# Patient Record
Sex: FEMALE | Race: WHITE | NOT HISPANIC OR LATINO | Employment: FULL TIME | URBAN - METROPOLITAN AREA
[De-identification: names, ages, dates, MRNs, and addresses within clinical notes are randomized per-mention and may not be internally consistent; named-entity substitution may affect disease eponyms.]

---

## 2021-02-15 ENCOUNTER — APPOINTMENT (OUTPATIENT)
Dept: VACCINE CLINIC | Facility: HOSPITAL | Age: 41
End: 2021-02-15

## 2023-04-11 ENCOUNTER — OFFICE VISIT (OUTPATIENT)
Dept: OBSTETRICS AND GYNECOLOGY | Facility: CLINIC | Age: 43
End: 2023-04-11
Payer: COMMERCIAL

## 2023-04-11 VITALS
WEIGHT: 123.4 LBS | DIASTOLIC BLOOD PRESSURE: 62 MMHG | HEIGHT: 66 IN | SYSTOLIC BLOOD PRESSURE: 106 MMHG | BODY MASS INDEX: 19.83 KG/M2

## 2023-04-11 DIAGNOSIS — Z01.419 WOMEN'S ANNUAL ROUTINE GYNECOLOGICAL EXAMINATION: Primary | ICD-10-CM

## 2023-04-11 DIAGNOSIS — Z12.31 BREAST CANCER SCREENING BY MAMMOGRAM: ICD-10-CM

## 2023-04-11 NOTE — PROGRESS NOTES
Gynecologic Annual Exam Note          GYN Annual Exam     Establish Care and Gynecologic Exam        Subjective     HPI  Feli Good is a 43 y.o. female, , who presents for annual well woman exam as a previous patient not seen in the last three years . Patient's last menstrual period was 2023 (exact date)..  Patient reports problems with: none.  Her periods occur every 25-35 days , lasting >7 days. The flow is moderate.. She reports dysmenorrhea is mild, occurring premenstrually and first 1-2 days of flow. Partner Status: Marital Status: . She is is sexually active. She has not had new partners.. STD testing recommendations have been explained to the patient and she does not desire STD testing. There were no changes to her medical or surgical history since her last visit..       Additional OB/GYN History   Current contraception: contraceptive methods: Vasectomy   Desires to: do not start contraception    Last Pap : 2020. Result: negative. HPV: not done. ([facility])  Last Completed Pap Smear     This patient has no relevant Health Maintenance data.        History of abnormal Pap smear: no  Family history of uterine, colon, breast, or ovarian cancer: no  Performs monthly Self-Breast Exam: yes  Last mammogram: 2015.     Last Completed Mammogram     This patient has no relevant Health Maintenance data.          History of abnormal mammogram: no    Colonoscopy: has never had a colonoscopy.  Exercises Regularly: yes  Feelings of Anxiety or Depression: no  Tobacco Usage?: No     No current outpatient medications on file.     Patient denies the need for medication refills today.    OB History        2    Para   2    Term   2            AB        Living   2       SAB        IAB        Ectopic        Molar        Multiple        Live Births   2                History reviewed. No pertinent past medical history.     History reviewed. No pertinent surgical history.    Health  "Maintenance   Topic Date Due   • Annual Gynecologic Pelvic and Breast Exam  Never done   • TDAP/TD VACCINES (1 - Tdap) Never done   • MAMMOGRAM  12/21/2016   • COVID-19 Vaccine (3 - Booster for Moderna series) 04/23/2021   • HEPATITIS C SCREENING  Never done   • ANNUAL PHYSICAL  Never done   • PAP SMEAR  Never done   • INFLUENZA VACCINE  08/01/2023   • Pneumococcal Vaccine 0-64  Aged Out       The additional following portions of the patient's history were reviewed and updated as appropriate: allergies, current medications, past family history, past medical history, past social history and past surgical history.    Review of Systems      I have reviewed and agree with the HPI, ROS, and historical information as entered above. Radha Al, APRN      Objective   /62 (BP Location: Right arm, Patient Position: Sitting, Cuff Size: Adult)   Ht 167.6 cm (66\")   Wt 56 kg (123 lb 6.4 oz)   LMP 03/25/2023 (Exact Date)   BMI 19.92 kg/m²     Physical Exam  Vitals and nursing note reviewed. Exam conducted with a chaperone present.   Constitutional:       General: She is not in acute distress.     Appearance: Normal appearance. She is well-developed. She is not ill-appearing.   Neck:      Thyroid: No thyroid mass or thyromegaly.   Pulmonary:      Effort: Pulmonary effort is normal. No respiratory distress or retractions.   Chest:      Chest wall: No mass.   Breasts:     Right: Normal. No mass, nipple discharge, skin change or tenderness.      Left: Normal. No mass, nipple discharge, skin change or tenderness.   Abdominal:      General: There is no distension.      Palpations: Abdomen is soft. Abdomen is not rigid. There is no mass.      Tenderness: There is no abdominal tenderness. There is no guarding or rebound.      Hernia: No hernia is present. There is no hernia in the left inguinal area.   Genitourinary:     General: Normal vulva.      Labia:         Right: No rash, tenderness or lesion.         Left: No rash, " tenderness or lesion.       Vagina: Normal. No vaginal discharge or lesions.      Cervix: Normal.      Uterus: Normal. Not enlarged, not fixed and not tender.       Adnexa: Right adnexa normal and left adnexa normal.        Right: No mass or tenderness.          Left: No mass or tenderness.        Rectum: No external hemorrhoid.   Musculoskeletal:      Cervical back: No muscular tenderness.   Skin:     General: Skin is warm and dry.   Neurological:      Mental Status: She is alert and oriented to person, place, and time.   Psychiatric:         Mood and Affect: Mood normal.         Behavior: Behavior normal.            Assessment and Plan    Problem List Items Addressed This Visit    None  Visit Diagnoses     Women's annual routine gynecological examination    -  Primary    Relevant Orders    LIQUID-BASED PAP SMEAR WITH HPV GENOTYPING REGARDLESS OF INTERPRETATION ([facility])    Breast cancer screening by mammogram        Relevant Orders    Mammo Screening Digital Tomosynthesis Bilateral With CAD          1. GYN annual well woman exam.   2. Pap guidelines reviewed.  3. Reviewed monthly self breast exams.  Instructed to call with lumps, pain, or breast discharge.    4. Ordered Mammogram today  5. Reviewed exercise as a preventative health measures.   6. Reccommended Flu Vaccine in Fall of each year.  7. RTC in 1 year or PRN with problems.  8. Return in about 1 year (around 4/11/2024) for Annual physical.     Radha Al, APRN  04/11/2023

## 2025-03-21 ENCOUNTER — APPOINTMENT (OUTPATIENT)
Dept: GENERAL RADIOLOGY | Facility: HOSPITAL | Age: 45
End: 2025-03-21
Payer: COMMERCIAL

## 2025-03-21 ENCOUNTER — HOSPITAL ENCOUNTER (EMERGENCY)
Facility: HOSPITAL | Age: 45
Discharge: HOME | End: 2025-03-21
Attending: STUDENT IN AN ORGANIZED HEALTH CARE EDUCATION/TRAINING PROGRAM
Payer: COMMERCIAL

## 2025-03-21 VITALS
TEMPERATURE: 97.7 F | RESPIRATION RATE: 18 BRPM | DIASTOLIC BLOOD PRESSURE: 77 MMHG | BODY MASS INDEX: 20.09 KG/M2 | SYSTOLIC BLOOD PRESSURE: 137 MMHG | HEIGHT: 66 IN | WEIGHT: 125 LBS | OXYGEN SATURATION: 100 % | HEART RATE: 93 BPM

## 2025-03-21 DIAGNOSIS — R42 LIGHTHEADED: ICD-10-CM

## 2025-03-21 DIAGNOSIS — R55 POSTURAL DIZZINESS WITH PRESYNCOPE: Primary | ICD-10-CM

## 2025-03-21 DIAGNOSIS — R25.1 TREMULOUSNESS: ICD-10-CM

## 2025-03-21 DIAGNOSIS — R42 POSTURAL DIZZINESS WITH PRESYNCOPE: Primary | ICD-10-CM

## 2025-03-21 LAB
ALBUMIN SERPL-MCNC: 4.5 G/DL (ref 3.5–5.2)
ALBUMIN/GLOB SERPL: 2 G/DL
ALP SERPL-CCNC: 64 U/L (ref 39–117)
ALT SERPL W P-5'-P-CCNC: 26 U/L (ref 1–33)
AMPHET+METHAMPHET UR QL: NEGATIVE
AMPHETAMINES UR QL: NEGATIVE
ANION GAP SERPL CALCULATED.3IONS-SCNC: 11 MMOL/L (ref 5–15)
APAP SERPL-MCNC: <5 MCG/ML (ref 0–30)
AST SERPL-CCNC: 26 U/L (ref 1–32)
B-HCG UR QL: NEGATIVE
BARBITURATES UR QL SCN: NEGATIVE
BASOPHILS # BLD AUTO: 0.05 10*3/MM3 (ref 0–0.2)
BASOPHILS NFR BLD AUTO: 0.7 % (ref 0–1.5)
BENZODIAZ UR QL SCN: NEGATIVE
BILIRUB SERPL-MCNC: 0.4 MG/DL (ref 0–1.2)
BILIRUB UR QL STRIP: NEGATIVE
BUN SERPL-MCNC: 12 MG/DL (ref 6–20)
BUN/CREAT SERPL: 23.1 (ref 7–25)
BUPRENORPHINE SERPL-MCNC: NEGATIVE NG/ML
CALCIUM SPEC-SCNC: 9.1 MG/DL (ref 8.6–10.5)
CANNABINOIDS SERPL QL: NEGATIVE
CHLORIDE SERPL-SCNC: 103 MMOL/L (ref 98–107)
CLARITY UR: CLEAR
CO2 SERPL-SCNC: 25 MMOL/L (ref 22–29)
COCAINE UR QL: NEGATIVE
COLOR UR: YELLOW
CREAT SERPL-MCNC: 0.52 MG/DL (ref 0.57–1)
D DIMER PPP FEU-MCNC: <0.27 MCGFEU/ML (ref 0–0.5)
D-LACTATE SERPL-SCNC: 1.2 MMOL/L (ref 0.5–2)
DEPRECATED RDW RBC AUTO: 40 FL (ref 37–54)
EGFRCR SERPLBLD CKD-EPI 2021: 117.7 ML/MIN/1.73
EOSINOPHIL # BLD AUTO: 0.05 10*3/MM3 (ref 0–0.4)
EOSINOPHIL NFR BLD AUTO: 0.7 % (ref 0.3–6.2)
ERYTHROCYTE [DISTWIDTH] IN BLOOD BY AUTOMATED COUNT: 11.9 % (ref 12.3–15.4)
ETHANOL BLD-MCNC: <10 MG/DL (ref 0–10)
EXPIRATION DATE: NORMAL
FENTANYL UR-MCNC: NEGATIVE NG/ML
GLOBULIN UR ELPH-MCNC: 2.2 GM/DL
GLUCOSE SERPL-MCNC: 109 MG/DL (ref 65–99)
GLUCOSE UR STRIP-MCNC: NEGATIVE MG/DL
HCT VFR BLD AUTO: 41.1 % (ref 34–46.6)
HGB BLD-MCNC: 14.4 G/DL (ref 12–15.9)
HGB UR QL STRIP.AUTO: NEGATIVE
IMM GRANULOCYTES # BLD AUTO: 0.02 10*3/MM3 (ref 0–0.05)
IMM GRANULOCYTES NFR BLD AUTO: 0.3 % (ref 0–0.5)
INTERNAL NEGATIVE CONTROL: NEGATIVE
INTERNAL POSITIVE CONTROL: POSITIVE
KETONES UR QL STRIP: NEGATIVE
LEUKOCYTE ESTERASE UR QL STRIP.AUTO: NEGATIVE
LYMPHOCYTES # BLD AUTO: 1.03 10*3/MM3 (ref 0.7–3.1)
LYMPHOCYTES NFR BLD AUTO: 15.2 % (ref 19.6–45.3)
Lab: NORMAL
MAGNESIUM SERPL-MCNC: 1.9 MG/DL (ref 1.6–2.6)
MCH RBC QN AUTO: 31.9 PG (ref 26.6–33)
MCHC RBC AUTO-ENTMCNC: 35 G/DL (ref 31.5–35.7)
MCV RBC AUTO: 90.9 FL (ref 79–97)
METHADONE UR QL SCN: NEGATIVE
MONOCYTES # BLD AUTO: 0.34 10*3/MM3 (ref 0.1–0.9)
MONOCYTES NFR BLD AUTO: 5 % (ref 5–12)
NEUTROPHILS NFR BLD AUTO: 5.3 10*3/MM3 (ref 1.7–7)
NEUTROPHILS NFR BLD AUTO: 78.1 % (ref 42.7–76)
NITRITE UR QL STRIP: NEGATIVE
NRBC BLD AUTO-RTO: 0 /100 WBC (ref 0–0.2)
NT-PROBNP SERPL-MCNC: 168.2 PG/ML (ref 0–450)
OPIATES UR QL: NEGATIVE
OXYCODONE UR QL SCN: NEGATIVE
PCP UR QL SCN: NEGATIVE
PH UR STRIP.AUTO: 6.5 [PH] (ref 5–8)
PHOSPHATE SERPL-MCNC: 1.6 MG/DL (ref 2.5–4.5)
PLATELET # BLD AUTO: 197 10*3/MM3 (ref 140–450)
PMV BLD AUTO: 9.9 FL (ref 6–12)
POTASSIUM SERPL-SCNC: 3.5 MMOL/L (ref 3.5–5.2)
PROT SERPL-MCNC: 6.7 G/DL (ref 6–8.5)
PROT UR QL STRIP: NEGATIVE
RBC # BLD AUTO: 4.52 10*6/MM3 (ref 3.77–5.28)
SALICYLATES SERPL-MCNC: <0.3 MG/DL
SODIUM SERPL-SCNC: 139 MMOL/L (ref 136–145)
SP GR UR STRIP: 1.01 (ref 1–1.03)
T4 FREE SERPL-MCNC: 1.39 NG/DL (ref 0.92–1.68)
TRICYCLICS UR QL SCN: NEGATIVE
TROPONIN T SERPL HS-MCNC: <6 NG/L
TSH SERPL DL<=0.05 MIU/L-ACNC: 3.42 UIU/ML (ref 0.27–4.2)
UROBILINOGEN UR QL STRIP: NORMAL
WBC NRBC COR # BLD AUTO: 6.79 10*3/MM3 (ref 3.4–10.8)

## 2025-03-21 PROCEDURE — 82077 ASSAY SPEC XCP UR&BREATH IA: CPT | Performed by: STUDENT IN AN ORGANIZED HEALTH CARE EDUCATION/TRAINING PROGRAM

## 2025-03-21 PROCEDURE — 84439 ASSAY OF FREE THYROXINE: CPT | Performed by: STUDENT IN AN ORGANIZED HEALTH CARE EDUCATION/TRAINING PROGRAM

## 2025-03-21 PROCEDURE — 71045 X-RAY EXAM CHEST 1 VIEW: CPT

## 2025-03-21 PROCEDURE — 83880 ASSAY OF NATRIURETIC PEPTIDE: CPT | Performed by: STUDENT IN AN ORGANIZED HEALTH CARE EDUCATION/TRAINING PROGRAM

## 2025-03-21 PROCEDURE — 84100 ASSAY OF PHOSPHORUS: CPT | Performed by: STUDENT IN AN ORGANIZED HEALTH CARE EDUCATION/TRAINING PROGRAM

## 2025-03-21 PROCEDURE — 80179 DRUG ASSAY SALICYLATE: CPT | Performed by: STUDENT IN AN ORGANIZED HEALTH CARE EDUCATION/TRAINING PROGRAM

## 2025-03-21 PROCEDURE — 83735 ASSAY OF MAGNESIUM: CPT | Performed by: STUDENT IN AN ORGANIZED HEALTH CARE EDUCATION/TRAINING PROGRAM

## 2025-03-21 PROCEDURE — 85379 FIBRIN DEGRADATION QUANT: CPT | Performed by: STUDENT IN AN ORGANIZED HEALTH CARE EDUCATION/TRAINING PROGRAM

## 2025-03-21 PROCEDURE — 80053 COMPREHEN METABOLIC PANEL: CPT | Performed by: STUDENT IN AN ORGANIZED HEALTH CARE EDUCATION/TRAINING PROGRAM

## 2025-03-21 PROCEDURE — 85025 COMPLETE CBC W/AUTO DIFF WBC: CPT | Performed by: STUDENT IN AN ORGANIZED HEALTH CARE EDUCATION/TRAINING PROGRAM

## 2025-03-21 PROCEDURE — 81025 URINE PREGNANCY TEST: CPT | Performed by: STUDENT IN AN ORGANIZED HEALTH CARE EDUCATION/TRAINING PROGRAM

## 2025-03-21 PROCEDURE — 80307 DRUG TEST PRSMV CHEM ANLYZR: CPT | Performed by: STUDENT IN AN ORGANIZED HEALTH CARE EDUCATION/TRAINING PROGRAM

## 2025-03-21 PROCEDURE — 99284 EMERGENCY DEPT VISIT MOD MDM: CPT

## 2025-03-21 PROCEDURE — 80143 DRUG ASSAY ACETAMINOPHEN: CPT | Performed by: STUDENT IN AN ORGANIZED HEALTH CARE EDUCATION/TRAINING PROGRAM

## 2025-03-21 PROCEDURE — 93005 ELECTROCARDIOGRAM TRACING: CPT | Performed by: STUDENT IN AN ORGANIZED HEALTH CARE EDUCATION/TRAINING PROGRAM

## 2025-03-21 PROCEDURE — 84443 ASSAY THYROID STIM HORMONE: CPT | Performed by: STUDENT IN AN ORGANIZED HEALTH CARE EDUCATION/TRAINING PROGRAM

## 2025-03-21 PROCEDURE — 25810000003 LACTATED RINGERS SOLUTION: Performed by: STUDENT IN AN ORGANIZED HEALTH CARE EDUCATION/TRAINING PROGRAM

## 2025-03-21 PROCEDURE — 96360 HYDRATION IV INFUSION INIT: CPT

## 2025-03-21 PROCEDURE — 81003 URINALYSIS AUTO W/O SCOPE: CPT | Performed by: STUDENT IN AN ORGANIZED HEALTH CARE EDUCATION/TRAINING PROGRAM

## 2025-03-21 PROCEDURE — 84484 ASSAY OF TROPONIN QUANT: CPT | Performed by: STUDENT IN AN ORGANIZED HEALTH CARE EDUCATION/TRAINING PROGRAM

## 2025-03-21 PROCEDURE — 83605 ASSAY OF LACTIC ACID: CPT | Performed by: STUDENT IN AN ORGANIZED HEALTH CARE EDUCATION/TRAINING PROGRAM

## 2025-03-21 PROCEDURE — 36415 COLL VENOUS BLD VENIPUNCTURE: CPT

## 2025-03-21 RX ORDER — MECLIZINE HYDROCHLORIDE 25 MG/1
25 TABLET ORAL ONCE
Status: DISCONTINUED | OUTPATIENT
Start: 2025-03-21 | End: 2025-03-21 | Stop reason: HOSPADM

## 2025-03-21 RX ORDER — HYDROXYZINE HYDROCHLORIDE 25 MG/1
25 TABLET, FILM COATED ORAL EVERY 8 HOURS PRN
Qty: 10 TABLET | Refills: 0 | Status: SHIPPED | OUTPATIENT
Start: 2025-03-21

## 2025-03-21 RX ORDER — HYDROXYZINE HYDROCHLORIDE 25 MG/1
25 TABLET, FILM COATED ORAL ONCE
Status: DISCONTINUED | OUTPATIENT
Start: 2025-03-21 | End: 2025-03-21 | Stop reason: HOSPADM

## 2025-03-21 RX ADMIN — SODIUM CHLORIDE, SODIUM LACTATE, POTASSIUM CHLORIDE, AND CALCIUM CHLORIDE 1000 ML: .6; .31; .03; .02 INJECTION, SOLUTION INTRAVENOUS at 17:39

## 2025-03-21 RX ADMIN — POTASSIUM, SODIUM PHOSPHATES 280 MG-160 MG-250 MG ORAL POWDER PACKET 2 PACKET: POWDER IN PACKET at 18:00

## 2025-03-21 NOTE — ED PROVIDER NOTES
EMERGENCY DEPARTMENT ENCOUNTER    Pt Name: Feli Good  MRN: [id number]  Pt :   1980  Room Number:    Date of encounter:  3/21/2025  PCP: Vicente Mann DO  ED Provider: Ish Schultz MD    Historian: Mother, patient      HPI:  Chief Complaint: Presyncope        Context: Feli Good is a 45-year-old woman who is accompanied by her mother provides most of the history she was brought in by EMS from her PCP because of recurrent episodes of lightheadedness she says she had similar things when she was a teenager and was diagnosed with vasovagal syncope.  Today she has had 6 episodes of feeling extremely lightheaded she also feels tremulous without chest pain.  She says she does have history of elevated thyroid antibodies but is not on any thyroid medication she denies recent illness or fevers denies any new stressors or fatigue.  No other complaints at this time.       PAST MEDICAL HISTORY  No past medical history on file.      PAST SURGICAL HISTORY  No past surgical history on file.      FAMILY HISTORY  Family History   Problem Relation Age of Onset    Thyroid disease Father     Thyroid disease Paternal Grandmother     Thyroid disease Paternal Aunt     Breast cancer Neg Hx     Ovarian cancer Neg Hx     Uterine cancer Neg Hx     Colon cancer Neg Hx          SOCIAL HISTORY  Social History     Socioeconomic History    Marital status:    Tobacco Use    Smoking status: Never    Smokeless tobacco: Never   Vaping Use    Vaping status: Never Used   Substance and Sexual Activity    Alcohol use: Yes     Alcohol/week: 3.0 standard drinks of alcohol     Types: 3 Glasses of wine per week    Drug use: Never    Sexual activity: Yes     Partners: Male     Birth control/protection: Vasectomy         ALLERGIES  Patient has no known allergies.        REVIEW OF SYSTEMS  Review of Systems       All systems reviewed and negative except for those discussed in HPI.       PHYSICAL EXAM    I have reviewed the  triage vital signs and nursing notes.    ED Triage Vitals [03/21/25 1606]   Temp Heart Rate Resp BP SpO2   97.7 °F (36.5 °C) 90 16 134/87 100 %      Temp src Heart Rate Source Patient Position BP Location FiO2 (%)   Oral Monitor Sitting Right arm --       Physical Exam  GENERAL:   Appears anxious, uncomfortable  HENT: Nares patent.  EYES: No scleral icterus.  CV: Regular rhythm, regular rate.  RESPIRATORY: Normal effort.  No audible wheezes, rales or rhonchi.  ABDOMEN: Soft, nontender  MUSCULOSKELETAL: No deformities.   NEURO: Alert, moves all extremities, follows commands.  SKIN: Warm, dry, no rash visualized.      LAB RESULTS  Recent Results (from the past 24 hours)   Comprehensive Metabolic Panel    Collection Time: 03/21/25  4:36 PM    Specimen: Blood   Result Value Ref Range    Glucose 109 (H) 65 - 99 mg/dL    BUN 12 6 - 20 mg/dL    Creatinine 0.52 (L) 0.57 - 1.00 mg/dL    Sodium 139 136 - 145 mmol/L    Potassium 3.5 3.5 - 5.2 mmol/L    Chloride 103 98 - 107 mmol/L    CO2 25.0 22.0 - 29.0 mmol/L    Calcium 9.1 8.6 - 10.5 mg/dL    Total Protein 6.7 6.0 - 8.5 g/dL    Albumin 4.5 3.5 - 5.2 g/dL    ALT (SGPT) 26 1 - 33 U/L    AST (SGOT) 26 1 - 32 U/L    Alkaline Phosphatase 64 39 - 117 U/L    Total Bilirubin 0.4 0.0 - 1.2 mg/dL    Globulin 2.2 gm/dL    A/G Ratio 2.0 g/dL    BUN/Creatinine Ratio 23.1 7.0 - 25.0    Anion Gap 11.0 5.0 - 15.0 mmol/L    eGFR 117.7 >60.0 mL/min/1.73   High Sensitivity Troponin T    Collection Time: 03/21/25  4:36 PM    Specimen: Blood   Result Value Ref Range    HS Troponin T <6 <14 ng/L   BNP    Collection Time: 03/21/25  4:36 PM    Specimen: Blood   Result Value Ref Range    proBNP 168.2 0.0 - 450.0 pg/mL   Lactic Acid, Plasma    Collection Time: 03/21/25  4:36 PM    Specimen: Blood   Result Value Ref Range    Lactate 1.2 0.5 - 2.0 mmol/L   Magnesium    Collection Time: 03/21/25  4:36 PM    Specimen: Blood   Result Value Ref Range    Magnesium 1.9 1.6 - 2.6 mg/dL   Phosphorus     Collection Time: 03/21/25  4:36 PM    Specimen: Blood   Result Value Ref Range    Phosphorus 1.6 (C) 2.5 - 4.5 mg/dL   TSH    Collection Time: 03/21/25  4:36 PM    Specimen: Blood   Result Value Ref Range    TSH 3.420 0.270 - 4.200 uIU/mL   T4, Free    Collection Time: 03/21/25  4:36 PM    Specimen: Blood   Result Value Ref Range    Free T4 1.39 0.92 - 1.68 ng/dL   CBC Auto Differential    Collection Time: 03/21/25  4:36 PM    Specimen: Blood   Result Value Ref Range    WBC 6.79 3.40 - 10.80 10*3/mm3    RBC 4.52 3.77 - 5.28 10*6/mm3    Hemoglobin 14.4 12.0 - 15.9 g/dL    Hematocrit 41.1 34.0 - 46.6 %    MCV 90.9 79.0 - 97.0 fL    MCH 31.9 26.6 - 33.0 pg    MCHC 35.0 31.5 - 35.7 g/dL    RDW 11.9 (L) 12.3 - 15.4 %    RDW-SD 40.0 37.0 - 54.0 fl    MPV 9.9 6.0 - 12.0 fL    Platelets 197 140 - 450 10*3/mm3    Neutrophil % 78.1 (H) 42.7 - 76.0 %    Lymphocyte % 15.2 (L) 19.6 - 45.3 %    Monocyte % 5.0 5.0 - 12.0 %    Eosinophil % 0.7 0.3 - 6.2 %    Basophil % 0.7 0.0 - 1.5 %    Immature Grans % 0.3 0.0 - 0.5 %    Neutrophils, Absolute 5.30 1.70 - 7.00 10*3/mm3    Lymphocytes, Absolute 1.03 0.70 - 3.10 10*3/mm3    Monocytes, Absolute 0.34 0.10 - 0.90 10*3/mm3    Eosinophils, Absolute 0.05 0.00 - 0.40 10*3/mm3    Basophils, Absolute 0.05 0.00 - 0.20 10*3/mm3    Immature Grans, Absolute 0.02 0.00 - 0.05 10*3/mm3    nRBC 0.0 0.0 - 0.2 /100 WBC   D-dimer, Quantitative    Collection Time: 03/21/25  4:36 PM    Specimen: Blood   Result Value Ref Range    D-Dimer, Quantitative <0.27 0.00 - 0.50 MCGFEU/mL   Acetaminophen Level    Collection Time: 03/21/25  4:36 PM    Specimen: Blood   Result Value Ref Range    Acetaminophen <5.0 0.0 - 30.0 mcg/mL   Ethanol    Collection Time: 03/21/25  4:36 PM    Specimen: Blood   Result Value Ref Range    Ethanol <10 0 - 10 mg/dL   Salicylate Level    Collection Time: 03/21/25  4:36 PM    Specimen: Blood   Result Value Ref Range    Salicylate <0.3 <=30.0 mg/dL   Urinalysis With Microscopic If  Indicated (No Culture) - Urine, Clean Catch    Collection Time: 03/21/25  4:37 PM    Specimen: Urine, Clean Catch   Result Value Ref Range    Color, UA Yellow Yellow, Straw    Appearance, UA Clear Clear    pH, UA 6.5 5.0 - 8.0    Specific Gravity, UA 1.013 1.001 - 1.030    Glucose, UA Negative Negative    Ketones, UA Negative Negative    Bilirubin, UA Negative Negative    Blood, UA Negative Negative    Protein, UA Negative Negative    Leuk Esterase, UA Negative Negative    Nitrite, UA Negative Negative    Urobilinogen, UA 0.2 E.U./dL 0.2 - 1.0 E.U./dL   Urine Drug Screen - Urine, Clean Catch    Collection Time: 03/21/25  4:37 PM    Specimen: Urine, Clean Catch   Result Value Ref Range    THC, Screen, Urine Negative Negative    Phencyclidine (PCP), Urine Negative Negative    Cocaine Screen, Urine Negative Negative    Methamphetamine, Ur Negative Negative    Opiate Screen Negative Negative    Amphetamine Screen, Urine Negative Negative    Benzodiazepine Screen, Urine Negative Negative    Tricyclic Antidepressants Screen Negative Negative    Methadone Screen, Urine Negative Negative    Barbiturates Screen, Urine Negative Negative    Oxycodone Screen, Urine Negative Negative    Buprenorphine, Screen, Urine Negative Negative   Fentanyl, Urine - Urine, Clean Catch    Collection Time: 03/21/25  4:37 PM    Specimen: Urine, Clean Catch   Result Value Ref Range    Fentanyl, Urine Negative Negative   ECG 12 Lead Syncope    Collection Time: 03/21/25  4:40 PM   Result Value Ref Range    QT Interval 370 ms    QTC Interval 437 ms   POC Urine Pregnancy    Collection Time: 03/21/25  4:47 PM    Specimen: Urine   Result Value Ref Range    HCG, Urine, QL Negative     Lot Number [id number]     Internal Positive Control Positive     Internal Negative Control Negative     Expiration Date 05/27/2026        If labs were ordered, I independently reviewed the results and considered them in treating the patient.        RADIOLOGY  XR Chest 1  View  Result Date: 3/21/2025  XR CHEST 1 VW Date of Exam: 3/21/2025 5:19 PM EDT Indication: palpitations, presyncope Comparison: None available. Findings: Lungs are well expanded and appear clear. No pneumothorax or large pleural effusion is seen. Heart size is normal. There is mild leftward curvature of the upper thoracic spine. Zipper projects over the upper chest.     Impression: No acute cardiopulmonary abnormality is identified. Electronically Signed: Coco Madden  3/21/2025 5:31 PM EDT  Workstation ID: [id number]      I ordered and independently reviewed the above noted radiographic studies.      I viewed images of chest x-ray which showed no acute pathology per my independent interpretation.    See radiologist's dictation for official interpretation.        PROCEDURES    Procedures    ECG 12 Lead Syncope   Preliminary Result   Test Reason : Syncope   Blood Pressure :   */*   mmHG   Vent. Rate :  84 BPM     Atrial Rate :  84 BPM      P-R Int : 144 ms          QRS Dur : 106 ms       QT Int : 370 ms       P-R-T Axes :  81  83  58 degrees     QTcB Int : 437 ms      Normal sinus rhythm with sinus arrhythmia   Incomplete right bundle branch block   Borderline ECG   No previous ECGs available      Referred By:            Confirmed By:           MEDICATIONS GIVEN IN ER    Medications   meclizine (ANTIVERT) tablet 25 mg (25 mg Oral Not Given 3/21/25 1750)   hydrOXYzine (ATARAX) tablet 25 mg (25 mg Oral Not Given 3/21/25 1750)   lactated ringers bolus 1,000 mL (1,000 mL Intravenous New Bag 3/21/25 1739)   potassium & sodium phosphates (PHOS-NAK) 280-160-250 MG packet 2 packet (2 packets Oral Given 3/21/25 1800)         MEDICAL DECISION MAKING, PROGRESS, and CONSULTS    All labs, if obtained, have been independently reviewed by me.  All radiology studies, if obtained, have been reviewed by me and the radiologist dictating the report.  All EKGs, if obtained, have been independently viewed and interpreted by me/my  attending physician.      Discussion below represents my analysis of pertinent findings related to patient's condition, differential diagnosis, treatment plan and final disposition.                                          Differential diagnosis:    Cardiac presyncope, vasovagal syncope, anxiety attacks, anemia, electrolyte abnormality, pulmonary embolism, hypothyroidism      Additional sources:    - Discussed/ obtained information from independent historians: Mother    - External (non-ED) record review:  Chart review shows outpatient PCP notes with history of shortness of breath and visit earlier today for lightheadedness.    - Chronic or social conditions impacting care: History of vasovagal episodes        Orders placed during this visit:  Orders Placed This Encounter   Procedures    XR Chest 1 View    Comprehensive Metabolic Panel    Urinalysis With Microscopic If Indicated (No Culture) - Urine, Clean Catch    High Sensitivity Troponin T    BNP    Lactic Acid, Plasma    Magnesium    Phosphorus    TSH    T4, Free    CBC Auto Differential    D-dimer, Quantitative    Acetaminophen Level    Ethanol    Urine Drug Screen - Urine, Clean Catch    Salicylate Level    Fentanyl, Urine - Urine, Clean Catch    Orthostatic Vitals    POC Urine Pregnancy    ECG 12 Lead Syncope    CBC & Differential         Additional orders considered but not ordered:      ED Course:    Consultants:      ED Course as of 03/21/25 1841   Fri Mar 21, 2025   1605 Chart review shows outpatient PCP notes with history of shortness of breath and visit earlier today for lightheadedness. [CC]   1614 This is a 45-year-old woman who is accompanied by her mother provides most of the history she was brought in by EMS from her PCP because of recurrent episodes of lightheadedness she says she had similar things when she was a teenager and was diagnosed with vasovagal syncope.  Today she has had 6 episodes of feeling extremely lightheaded she also feels  tremulous without chest pain.  She says she does have history of elevated thyroid antibodies but is not on any thyroid medication she denies recent illness or fevers denies any new stressors or fatigue.  No other complaints at this time. [CC]   1615 She arrived awake and alert vitals are within normal limits she is afebrile she is somewhat anxious in appearance wanting her mother to do most of the talking for her.  Concern for thyroid abnormalities myocardial infarction pulmonary embolism, anemia, electrolyte abnormality etc. started on IV fluids and obtaining basic laboratory workup. [CC]   1642 ECG showing normal sinus rhythm and incomplete right bundle at a rate of 84.  Normal axis no significant QRS widening or QT prolongation. [CC]   1755 CBC and CMP reassuring and nonactionable  No elevation in D-dimer  Toxicologic screening is negative  Troponin is undetectably low Wilkinson syncope score is 0.  Normal thyroid function  Mild hypophosphatemia at 1.6 this may be contributing to her symptoms, I have ordered oral repletion. [CC]   1755 Toxicologic screening is negative. [CC]   1839 Everything else reassuring and nonactionable she declined the meclizine and hydroxyzine saying she is not dizzy and does not want the anxiety medicine.  I discussed the results with the patient and her mother there adamant that this has to do with her thyroid antibodies I have explained that her thyroid hormone level release today is completely normal but encouraged them to follow-up with her PCP on this.  Counseled on return precautions verbally expressed understanding of these [CC]      ED Course User Index  [CC] Ish Schultz MD              Shared Decision Making:  After my consideration of clinical presentation and any laboratory/radiology studies obtained, I discussed the findings with the patient/patient representative who is in agreement with the treatment plan and the final disposition.   Risks and benefits of  discharge and/or observation/admission were discussed.       AS OF 18:41 EDT VITALS:    BP - 129/76  HR - 90  TEMP - 97.7 °F (36.5 °C) (Oral)  O2 SATS - 99%                  DIAGNOSIS  Final diagnoses:   Postural dizziness with presyncope   Lightheaded   Tremulousness         DISPOSITION  DISCHARGE    Patient discharged in stable condition.    Reviewed implications of results, diagnosis, meds, responsibility to follow up, warning signs and symptoms of possible worsening, potential complications and reasons to return to ER.    Patient/Family voiced understanding of above instructions.    Discussed plan for discharge, as there is no emergent indication for admission.  Pt/family is agreeable and understands need for follow up and possible repeat testing.  Pt/family is aware that discharge does not mean that nothing is wrong but that it indicates no emergency is currently present that requires admission and they must continue care with follow-up as given below or with a physician of their choice.     FOLLOW-UP  Vicente Mann DO  [address] [place] [address] [phone]    Call            Medication List        New Prescriptions      hydrOXYzine 25 MG tablet  Commonly known as: ATARAX  Take 1 tablet by mouth Every 8 (Eight) Hours As Needed for Anxiety.               Where to Get Your Medications        These medications were sent to [facility] [id number] - [facility] [address] - [phone]  - [phone]   [address] [address]      Phone: [phone]   hydrOXYzine 25 MG tablet             Please note that portions of this document were completed with voice recognition software.        Ish Schultz MD  03/21/25 7105

## 2025-03-21 NOTE — DISCHARGE INSTRUCTIONS
Follow-up with PCP.  While today's workup was reassuring if symptoms change or worsen please return the ED or seek other medical care

## 2025-03-23 ENCOUNTER — APPOINTMENT (OUTPATIENT)
Dept: CT IMAGING | Facility: HOSPITAL | Age: 45
End: 2025-03-23
Payer: COMMERCIAL

## 2025-03-23 ENCOUNTER — HOSPITAL ENCOUNTER (OUTPATIENT)
Facility: HOSPITAL | Age: 45
Setting detail: OBSERVATION
Discharge: HOME | End: 2025-03-24
Attending: EMERGENCY MEDICINE | Admitting: INTERNAL MEDICINE
Payer: COMMERCIAL

## 2025-03-23 ENCOUNTER — APPOINTMENT (OUTPATIENT)
Dept: GENERAL RADIOLOGY | Facility: HOSPITAL | Age: 45
End: 2025-03-23
Payer: COMMERCIAL

## 2025-03-23 DIAGNOSIS — R55 NEAR SYNCOPE: Primary | ICD-10-CM

## 2025-03-23 DIAGNOSIS — E83.39 HYPOPHOSPHATEMIA: ICD-10-CM

## 2025-03-23 DIAGNOSIS — Z86.39 HISTORY OF THYROID DISEASE: ICD-10-CM

## 2025-03-23 DIAGNOSIS — Z02.89 REFERRED BY HEALTH CARE PROFESSIONAL: ICD-10-CM

## 2025-03-23 PROBLEM — F41.8 OTHER SPECIFIED ANXIETY DISORDERS: Status: ACTIVE | Noted: 2025-03-23

## 2025-03-23 PROBLEM — F41.0 PANIC ATTACK: Status: ACTIVE | Noted: 2025-03-23

## 2025-03-23 LAB
ALBUMIN SERPL-MCNC: 4.5 G/DL (ref 3.5–5.2)
ALBUMIN/GLOB SERPL: 1.9 G/DL
ALP SERPL-CCNC: 65 U/L (ref 39–117)
ALT SERPL W P-5'-P-CCNC: 26 U/L (ref 1–33)
ANION GAP SERPL CALCULATED.3IONS-SCNC: 11 MMOL/L (ref 5–15)
AST SERPL-CCNC: 23 U/L (ref 1–32)
B-HCG UR QL: NEGATIVE
BACTERIA UR QL AUTO: ABNORMAL /HPF
BASOPHILS # BLD AUTO: 0.04 10*3/MM3 (ref 0–0.2)
BASOPHILS NFR BLD AUTO: 0.8 % (ref 0–1.5)
BILIRUB SERPL-MCNC: 0.7 MG/DL (ref 0–1.2)
BILIRUB UR QL STRIP: NEGATIVE
BUN SERPL-MCNC: 11 MG/DL (ref 6–20)
BUN/CREAT SERPL: 17.2 (ref 7–25)
CALCIUM SPEC-SCNC: 9.5 MG/DL (ref 8.6–10.5)
CHLORIDE SERPL-SCNC: 108 MMOL/L (ref 98–107)
CLARITY UR: ABNORMAL
CO2 SERPL-SCNC: 25 MMOL/L (ref 22–29)
COLOR UR: YELLOW
CREAT SERPL-MCNC: 0.64 MG/DL (ref 0.57–1)
D-LACTATE SERPL-SCNC: 1.4 MMOL/L (ref 0.5–2)
DEPRECATED RDW RBC AUTO: 40.9 FL (ref 37–54)
EGFRCR SERPLBLD CKD-EPI 2021: 111.9 ML/MIN/1.73
EOSINOPHIL # BLD AUTO: 0.13 10*3/MM3 (ref 0–0.4)
EOSINOPHIL NFR BLD AUTO: 2.5 % (ref 0.3–6.2)
ERYTHROCYTE [DISTWIDTH] IN BLOOD BY AUTOMATED COUNT: 12.1 % (ref 12.3–15.4)
EXPIRATION DATE: NORMAL
GEN 5 1HR TROPONIN T REFLEX: <6 NG/L
GLOBULIN UR ELPH-MCNC: 2.4 GM/DL
GLUCOSE SERPL-MCNC: 97 MG/DL (ref 65–99)
GLUCOSE UR STRIP-MCNC: NEGATIVE MG/DL
HCT VFR BLD AUTO: 44.8 % (ref 34–46.6)
HGB BLD-MCNC: 15.3 G/DL (ref 12–15.9)
HGB UR QL STRIP.AUTO: NEGATIVE
HOLD SPECIMEN: NORMAL
HYALINE CASTS UR QL AUTO: ABNORMAL /LPF
IMM GRANULOCYTES # BLD AUTO: 0.02 10*3/MM3 (ref 0–0.05)
IMM GRANULOCYTES NFR BLD AUTO: 0.4 % (ref 0–0.5)
INTERNAL NEGATIVE CONTROL: NEGATIVE
INTERNAL POSITIVE CONTROL: POSITIVE
KETONES UR QL STRIP: NEGATIVE
LEUKOCYTE ESTERASE UR QL STRIP.AUTO: ABNORMAL
LYMPHOCYTES # BLD AUTO: 1.29 10*3/MM3 (ref 0.7–3.1)
LYMPHOCYTES NFR BLD AUTO: 24.7 % (ref 19.6–45.3)
Lab: NORMAL
MAGNESIUM SERPL-MCNC: 1.9 MG/DL (ref 1.6–2.6)
MCH RBC QN AUTO: 31.4 PG (ref 26.6–33)
MCHC RBC AUTO-ENTMCNC: 34.2 G/DL (ref 31.5–35.7)
MCV RBC AUTO: 92 FL (ref 79–97)
MONOCYTES # BLD AUTO: 0.31 10*3/MM3 (ref 0.1–0.9)
MONOCYTES NFR BLD AUTO: 5.9 % (ref 5–12)
NEUTROPHILS NFR BLD AUTO: 3.44 10*3/MM3 (ref 1.7–7)
NEUTROPHILS NFR BLD AUTO: 65.7 % (ref 42.7–76)
NITRITE UR QL STRIP: NEGATIVE
NRBC BLD AUTO-RTO: 0 /100 WBC (ref 0–0.2)
PH UR STRIP.AUTO: 7.5 [PH] (ref 5–8)
PHOSPHATE SERPL-MCNC: 2.2 MG/DL (ref 2.5–4.5)
PLATELET # BLD AUTO: 204 10*3/MM3 (ref 140–450)
PMV BLD AUTO: 9.8 FL (ref 6–12)
POTASSIUM SERPL-SCNC: 4.2 MMOL/L (ref 3.5–5.2)
PROCALCITONIN SERPL-MCNC: 0.04 NG/ML (ref 0–0.25)
PROT SERPL-MCNC: 6.9 G/DL (ref 6–8.5)
PROT UR QL STRIP: NEGATIVE
QT INTERVAL: 358 MS
QTC INTERVAL: 454 MS
RBC # BLD AUTO: 4.87 10*6/MM3 (ref 3.77–5.28)
RBC # UR STRIP: ABNORMAL /HPF
REF LAB TEST METHOD: ABNORMAL
SODIUM SERPL-SCNC: 144 MMOL/L (ref 136–145)
SP GR UR STRIP: <=1.005 (ref 1–1.03)
SQUAMOUS #/AREA URNS HPF: ABNORMAL /HPF
T4 FREE SERPL-MCNC: 1.35 NG/DL (ref 0.92–1.68)
TROPONIN T NUMERIC DELTA: NORMAL
TROPONIN T SERPL HS-MCNC: <6 NG/L
TSH SERPL DL<=0.05 MIU/L-ACNC: 2.98 UIU/ML (ref 0.27–4.2)
UROBILINOGEN UR QL STRIP: ABNORMAL
WBC # UR STRIP: ABNORMAL /HPF
WBC NRBC COR # BLD AUTO: 5.23 10*3/MM3 (ref 3.4–10.8)
WHOLE BLOOD HOLD COAG: NORMAL
WHOLE BLOOD HOLD SPECIMEN: NORMAL

## 2025-03-23 PROCEDURE — G0378 HOSPITAL OBSERVATION PER HR: HCPCS

## 2025-03-23 PROCEDURE — 80053 COMPREHEN METABOLIC PANEL: CPT | Performed by: EMERGENCY MEDICINE

## 2025-03-23 PROCEDURE — 96365 THER/PROPH/DIAG IV INF INIT: CPT

## 2025-03-23 PROCEDURE — 25510000001 IOPAMIDOL PER 1 ML: Performed by: EMERGENCY MEDICINE

## 2025-03-23 PROCEDURE — 83735 ASSAY OF MAGNESIUM: CPT | Performed by: EMERGENCY MEDICINE

## 2025-03-23 PROCEDURE — 25810000003 SODIUM CHLORIDE 0.9 % SOLUTION 250 ML FLEX CONT: Performed by: EMERGENCY MEDICINE

## 2025-03-23 PROCEDURE — 84443 ASSAY THYROID STIM HORMONE: CPT | Performed by: NURSE PRACTITIONER

## 2025-03-23 PROCEDURE — 96366 THER/PROPH/DIAG IV INF ADDON: CPT

## 2025-03-23 PROCEDURE — 81025 URINE PREGNANCY TEST: CPT | Performed by: NURSE PRACTITIONER

## 2025-03-23 PROCEDURE — 81001 URINALYSIS AUTO W/SCOPE: CPT | Performed by: EMERGENCY MEDICINE

## 2025-03-23 PROCEDURE — 71045 X-RAY EXAM CHEST 1 VIEW: CPT

## 2025-03-23 PROCEDURE — 93005 ELECTROCARDIOGRAM TRACING: CPT | Performed by: EMERGENCY MEDICINE

## 2025-03-23 PROCEDURE — 84484 ASSAY OF TROPONIN QUANT: CPT | Performed by: EMERGENCY MEDICINE

## 2025-03-23 PROCEDURE — 36415 COLL VENOUS BLD VENIPUNCTURE: CPT

## 2025-03-23 PROCEDURE — 99285 EMERGENCY DEPT VISIT HI MDM: CPT

## 2025-03-23 PROCEDURE — 84145 PROCALCITONIN (PCT): CPT | Performed by: NURSE PRACTITIONER

## 2025-03-23 PROCEDURE — 70450 CT HEAD/BRAIN W/O DYE: CPT

## 2025-03-23 PROCEDURE — 25810000003 SODIUM CHLORIDE 0.9 % SOLUTION: Performed by: NURSE PRACTITIONER

## 2025-03-23 PROCEDURE — 83605 ASSAY OF LACTIC ACID: CPT | Performed by: NURSE PRACTITIONER

## 2025-03-23 PROCEDURE — 84100 ASSAY OF PHOSPHORUS: CPT | Performed by: NURSE PRACTITIONER

## 2025-03-23 PROCEDURE — 99222 1ST HOSP IP/OBS MODERATE 55: CPT | Performed by: STUDENT IN AN ORGANIZED HEALTH CARE EDUCATION/TRAINING PROGRAM

## 2025-03-23 PROCEDURE — 71275 CT ANGIOGRAPHY CHEST: CPT

## 2025-03-23 PROCEDURE — 84439 ASSAY OF FREE THYROXINE: CPT | Performed by: NURSE PRACTITIONER

## 2025-03-23 PROCEDURE — 85025 COMPLETE CBC W/AUTO DIFF WBC: CPT | Performed by: EMERGENCY MEDICINE

## 2025-03-23 RX ORDER — SODIUM CHLORIDE 0.9 % (FLUSH) 0.9 %
10 SYRINGE (ML) INJECTION AS NEEDED
Status: DISCONTINUED | OUTPATIENT
Start: 2025-03-23 | End: 2025-03-24 | Stop reason: HOSPADM

## 2025-03-23 RX ORDER — BISACODYL 5 MG/1
5 TABLET, DELAYED RELEASE ORAL DAILY PRN
Status: DISCONTINUED | OUTPATIENT
Start: 2025-03-23 | End: 2025-03-24 | Stop reason: HOSPADM

## 2025-03-23 RX ORDER — CLONAZEPAM 0.5 MG/1
0.25 TABLET ORAL 2 TIMES DAILY PRN
Status: DISCONTINUED | OUTPATIENT
Start: 2025-03-23 | End: 2025-03-24 | Stop reason: HOSPADM

## 2025-03-23 RX ORDER — IOPAMIDOL 755 MG/ML
85 INJECTION, SOLUTION INTRAVASCULAR
Status: COMPLETED | OUTPATIENT
Start: 2025-03-23 | End: 2025-03-23

## 2025-03-23 RX ORDER — CHOLECALCIFEROL (VITAMIN D3) 25 MCG
1000 TABLET ORAL DAILY
Status: DISCONTINUED | OUTPATIENT
Start: 2025-03-23 | End: 2025-03-24 | Stop reason: HOSPADM

## 2025-03-23 RX ORDER — ACETAMINOPHEN 160 MG/5ML
650 SOLUTION ORAL EVERY 4 HOURS PRN
Status: DISCONTINUED | OUTPATIENT
Start: 2025-03-23 | End: 2025-03-24 | Stop reason: HOSPADM

## 2025-03-23 RX ORDER — ONDANSETRON 2 MG/ML
4 INJECTION INTRAMUSCULAR; INTRAVENOUS EVERY 6 HOURS PRN
Status: DISCONTINUED | OUTPATIENT
Start: 2025-03-23 | End: 2025-03-24 | Stop reason: HOSPADM

## 2025-03-23 RX ORDER — ACETAMINOPHEN 650 MG/1
650 SUPPOSITORY RECTAL EVERY 4 HOURS PRN
Status: DISCONTINUED | OUTPATIENT
Start: 2025-03-23 | End: 2025-03-24 | Stop reason: HOSPADM

## 2025-03-23 RX ORDER — CHOLECALCIFEROL (VITAMIN D3) 25 MCG
1000 TABLET ORAL DAILY
COMMUNITY

## 2025-03-23 RX ORDER — ESCITALOPRAM OXALATE 10 MG/1
5 TABLET ORAL DAILY
Status: DISCONTINUED | OUTPATIENT
Start: 2025-03-23 | End: 2025-03-24 | Stop reason: HOSPADM

## 2025-03-23 RX ORDER — ACETAMINOPHEN 325 MG/1
650 TABLET ORAL EVERY 4 HOURS PRN
Status: DISCONTINUED | OUTPATIENT
Start: 2025-03-23 | End: 2025-03-24 | Stop reason: HOSPADM

## 2025-03-23 RX ORDER — ONDANSETRON 4 MG/1
4 TABLET, ORALLY DISINTEGRATING ORAL EVERY 6 HOURS PRN
Status: DISCONTINUED | OUTPATIENT
Start: 2025-03-23 | End: 2025-03-24 | Stop reason: HOSPADM

## 2025-03-23 RX ORDER — SODIUM CHLORIDE 0.9 % (FLUSH) 0.9 %
10 SYRINGE (ML) INJECTION EVERY 12 HOURS SCHEDULED
Status: DISCONTINUED | OUTPATIENT
Start: 2025-03-23 | End: 2025-03-24 | Stop reason: HOSPADM

## 2025-03-23 RX ORDER — POLYETHYLENE GLYCOL 3350 17 G/17G
17 POWDER, FOR SOLUTION ORAL DAILY PRN
Status: DISCONTINUED | OUTPATIENT
Start: 2025-03-23 | End: 2025-03-24 | Stop reason: HOSPADM

## 2025-03-23 RX ORDER — MAGNESIUM OXIDE 400 MG/1
400 TABLET ORAL DAILY
COMMUNITY

## 2025-03-23 RX ORDER — BISACODYL 10 MG
10 SUPPOSITORY, RECTAL RECTAL DAILY PRN
Status: DISCONTINUED | OUTPATIENT
Start: 2025-03-23 | End: 2025-03-24 | Stop reason: HOSPADM

## 2025-03-23 RX ORDER — AMOXICILLIN 250 MG
2 CAPSULE ORAL 2 TIMES DAILY PRN
Status: DISCONTINUED | OUTPATIENT
Start: 2025-03-23 | End: 2025-03-24 | Stop reason: HOSPADM

## 2025-03-23 RX ADMIN — CLONAZEPAM 0.25 MG: 0.5 TABLET ORAL at 20:17

## 2025-03-23 RX ADMIN — SODIUM CHLORIDE 15 MMOL: 9 INJECTION, SOLUTION INTRAVENOUS at 17:11

## 2025-03-23 RX ADMIN — ESCITALOPRAM OXALATE 5 MG: 10 TABLET ORAL at 17:43

## 2025-03-23 RX ADMIN — IOPAMIDOL 85 ML: 755 INJECTION, SOLUTION INTRAVENOUS at 10:58

## 2025-03-23 RX ADMIN — MAGNESIUM OXIDE TAB 400 MG (241.3 MG ELEMENTAL MG) 400 MG: 400 (241.3 MG) TAB at 17:12

## 2025-03-23 RX ADMIN — Medication 1000 UNITS: at 17:12

## 2025-03-23 RX ADMIN — SODIUM CHLORIDE 1000 ML: 9 INJECTION, SOLUTION INTRAVENOUS at 10:31

## 2025-03-23 RX ADMIN — Medication 10 ML: at 20:18

## 2025-03-23 NOTE — CASE MANAGEMENT/SOCIAL WORK
Discharge Planning Assessment  [facility]     Patient Name: Feli Good  MRN: [id number]  Today's Date: 3/23/2025    Admit Date: 3/23/2025    Plan: IDP   Discharge Needs Assessment       Row Name 03/23/25 1507       Living Environment    People in Home child(alexsander), dependent;spouse    Current Living Arrangements home    Primary Care Provided by self    Provides Primary Care For child(alexsander)    Family Caregiver if Needed spouse    Able to Return to Prior Arrangements yes       Transition Planning    Patient/Family Anticipates Transition to home    Transportation Anticipated family or friend will provide       Discharge Needs Assessment    Readmission Within the Last 30 Days no previous admission in last 30 days    Equipment Currently Used at Home none    Concerns to be Addressed denies needs/concerns at this time                   Discharge Plan       Row Name 03/23/25 151       Plan    Plan IDP    Plan Comments MSW met with Pt at bedside to complete IDP. Pt lives with spouse and children in [place] [facility] [place] [facility] [place]. Pt’s PCP is Vicente Mann. Pt has [employer] insurance coverage. Pt independent with ADLs; Pt reports no DME, HH, or home O2. Pt’s preferred [facility]. Pt still drives and spouse can transport when medically ready. Pt denied needs or concerns. CM will continue to follow.                       Demographic Summary       Row Name 03/23/25 1509       General Information    Arrived From home    Referral Source admission list;emergency department    Reason for Consult discharge planning    Preferred Language English                   Functional Status       Row Name 03/23/25 1502       Functional Status    Usual Activity Tolerance good    Current Activity Tolerance good       Functional Status, IADL    Medications independent    Meal Preparation independent    Housekeeping independent    Laundry independent    Shopping independent                               MARTY Joel

## 2025-03-23 NOTE — ED PROVIDER NOTES
Subjective   History of Present Illness  Pleasant patient presents the ER for multiple episodes of near syncope.  She has had several episodes today around 3.  She had several yesterday as well.  She tells me she has had the symptoms off and on for couple months but is gotten more frequent within the last several days.  She was in our ER recently.  Overall reassuring lab work except for decreased phosphorus.  Patient tells me she does have a history of thyroid disease.  She is seeing functional medicine in the past and was previously on some hormones but she stopped taking them around several weeks to several months ago, she*developing the symptoms.  She does report seeing a neurologist when she was a teenager and she was diagnosed with vasovagal syncope.  This is similar but much worse.        Review of Systems    No past medical history on file.    No Known Allergies    No past surgical history on file.    Family History   Problem Relation Age of Onset    Thyroid disease Father     Thyroid disease Paternal Grandmother     Thyroid disease Paternal Aunt     Breast cancer Neg Hx     Ovarian cancer Neg Hx     Uterine cancer Neg Hx     Colon cancer Neg Hx        Social History     Socioeconomic History    Marital status:    Tobacco Use    Smoking status: Never    Smokeless tobacco: Never   Vaping Use    Vaping status: Never Used   Substance and Sexual Activity    Alcohol use: Yes     Alcohol/week: 3.0 standard drinks of alcohol     Types: 3 Glasses of wine per week    Drug use: Never    Sexual activity: Yes     Partners: Male     Birth control/protection: Vasectomy           Objective   Physical Exam  Constitutional:       Appearance: She is well-developed.   HENT:      Head: Normocephalic and atraumatic.      Right Ear: External ear normal.      Left Ear: External ear normal.      Nose: Nose normal.   Eyes:      Conjunctiva/sclera: Conjunctivae normal.      Pupils: Pupils are equal, round, and reactive to  light.   Cardiovascular:      Rate and Rhythm: Normal rate and regular rhythm.      Heart sounds: Normal heart sounds.   Pulmonary:      Effort: Pulmonary effort is normal.      Breath sounds: Normal breath sounds.   Abdominal:      General: Bowel sounds are normal.      Palpations: Abdomen is soft.   Musculoskeletal:         General: Normal range of motion.      Cervical back: Normal range of motion and neck supple.   Skin:     General: Skin is warm and dry.   Neurological:      Mental Status: She is alert and oriented to person, place, and time.   Psychiatric:         Behavior: Behavior normal.         Thought Content: Thought content normal.         Judgment: Judgment normal.         Procedures           ED Course  ED Course as of 03/23/25 1417   Sun Mar 23, 2025   0935 EKG interpreted by myself with no acute process [JM]   1025 Differential diagnosis includes arrhythmia.  Pulmonary embolism.  Electrolyte abnormality.  Thyroid disease.  Old records reviewed.  We will repeat labs.  I will also end up getting a CTA of the chest along with a CT of the head she does report being on hormones within the last several weeks but she is no longer taking them.  She is having more frequent near syncopal episodes with several today and yesterday. [JM]   1120 CT scan of the chest and head interpreted by myself with no acute process [JM]   1314 I had a good conversation with the patient and her .  We reviewed all aspects of her results here including the CAT scans.  We utilize shared decision making.  We discussed getting an MRI and admission as she has had these continued episodes of near syncope.  At this time they would like to discuss admission amongst himself's. [JM]   1413 Will admit to the hospitalist. [JM]   1416 Please see radiology interpretation for official reading.    [JM]      ED Course User Index  [JM] Jorge Noel APRN                                                       Medical Decision  Making  Problems Addressed:  History of thyroid disease: complicated acute illness or injury  Hypophosphatemia: complicated acute illness or injury  Near syncope: complicated acute illness or injury  Referred by health care professional: complicated acute illness or injury    Amount and/or Complexity of Data Reviewed  External Data Reviewed: labs, radiology and ECG.  Labs: ordered. Decision-making details documented in ED Course.  Radiology: ordered. Decision-making details documented in ED Course.  ECG/medicine tests: ordered. Decision-making details documented in ED Course.    Risk  Prescription drug management.  Decision regarding hospitalization.        Final diagnoses:   Near syncope   Hypophosphatemia   History of thyroid disease   Referred by health care professional       ED Disposition  ED Disposition       ED Disposition   Decision to Admit    Condition   --    Comment   --               No follow-up provider specified.       Medication List      No changes were made to your prescriptions during this visit.            Jorge Noel, APRN  03/23/25 [id number]

## 2025-03-23 NOTE — H&P
[facility] Medicine Services  HISTORY AND PHYSICAL    Patient Name: Feli Good  : 1980  MRN: [id number]  Primary Care Physician: Vicente Mann DO  Date of admission: 3/23/2025    Subjective   Subjective     Chief Complaint:  lightheadedness    HPI:  Feli Good is a 45 y.o. female With history of possible vasovagal syncope, who presented for evaluation of presyncopal symptoms.  As a teenager, she had recurrent episodes of lightheadedness and was diagnosed with vasovagal syncope.  She was seen recently in our ER on 3/21 for presyncopal symptoms. She was sent home from the ER at that time.  Symptoms persisted and worsened and she presented again for further evaluation.  Patient stated that she had several episodes on Tuesday, Wednesday, Friday, and a few yesterday.  Episodes consist of beginning to feel lightheadedness, then feels like her heart is racing and feels like she is dying, she then feels tingling in both hands.  She feels very anxious during these episodes.  No chest pain.  She states that the episode gets better by taking slow deep breaths.  She states that these episodes were very similar through her life, but had resolved for several years.  Denied headache, visual changes, chest pain, shortness of breath, cough, vomiting, diarrhea, or urinary symptoms.  Had mild nausea today.  States that she is always stressed.  She also reports feeling intermittently lightheaded with standing.    In the ER, patient was afebrile, heart rate 95, blood pressure sitting 126/73 and standing 122/78.  High-sensitivity troponin less than 6 x 2, CMP unrevealing, CBC unrevealing, UA with small leuk esterase, no nitrites, 11-20 WBCs, 21-30 squamous cells, TSH 2.98, phosphorus 2.2.  CT head negative.  CTA chest negative for PE.  EKG with QTc 454, normal sinus rhythm.  She was given phosphorus, normal saline bolus.    Review of Systems   As above          Personal History     No past  medical history on file.  Denied prior health conditions  Denied prior surgeries         No past surgical history on file.    Family History:  family history includes Thyroid disease in her father, paternal aunt, and paternal grandmother.     Social History:  reports that she has never smoked. She has never used smokeless tobacco. She reports current alcohol use of about 3.0 standard drinks of alcohol per week. She reports that she does not use drugs.  Social History     Social History Narrative    Not on file       Medications:  Iodine, cholecalciferol, hydrOXYzine, and magnesium oxide    No Known Allergies    Objective   Objective     Vital Signs:   Temp:  [98.3 °F (36.8 °C)] 98.3 °F (36.8 °C)  Heart Rate:  [] 78  Resp:  [16] 16  BP: (110-126)/(70-78) 122/78    Physical Exam   Constitutional: Awake, alert  HENT: NCAT, mucous membranes moist  Respiratory: Clear to auscultation bilaterally, respiratory effort normal   Cardiovascular: RRR, no murmurs, HR 98  Gastrointestinal: Positive bowel sounds, soft, nontender, nondistended  Musculoskeletal: No bilateral ankle edema  Psychiatric: Appears anxious, cooperative  Neurologic: Alert, oriented, PERRL, symmetric facies, symmetric palate rise, tongue midline, moves all extremities, speech fluent  Skin: No rashes    Result Review:  I have personally reviewed the results from the time of this admission to 3/23/2025 14:59 EDT and agree with these findings:  []  Laboratory list / accordion  []  Microbiology  []  Radiology  []  EKG/Telemetry   []  Cardiology/Vascular   []  Pathology  []  Old records  []  Other:  Most notable findings include:     LAB RESULTS:      Lab 03/23/25  1016 03/21/25  1636   WBC 5.23 6.79   HEMOGLOBIN 15.3 14.4   HEMATOCRIT 44.8 41.1   PLATELETS 204 197   NEUTROS ABS 3.44 5.30   IMMATURE GRANS (ABS) 0.02 0.02   LYMPHS ABS 1.29 1.03   MONOS ABS 0.31 0.34   EOS ABS 0.13 0.05   MCV 92.0 90.9   PROCALCITONIN 0.04  --    LACTATE 1.4 1.2   D DIMER  QUANT  --  <0.27         Lab 03/23/25  1016 03/21/25  1636   SODIUM 144 139   POTASSIUM 4.2 3.5   CHLORIDE 108* 103   CO2 25.0 25.0   ANION GAP 11.0 11.0   BUN 11 12   CREATININE 0.64 0.52*   EGFR 111.9 117.7   GLUCOSE 97 109*   CALCIUM 9.5 9.1   MAGNESIUM 1.9 1.9   PHOSPHORUS 2.2* 1.6*   TSH 2.980 3.420         Lab 03/23/25  1016 03/21/25  1636   TOTAL PROTEIN 6.9 6.7   ALBUMIN 4.5 4.5   GLOBULIN 2.4 2.2   ALT (SGPT) 26 26   AST (SGOT) 23 26   BILIRUBIN 0.7 0.4   ALK PHOS 65 64         Lab 03/23/25  1201 03/23/25  1016 03/21/25  1636   PROBNP  --   --  168.2   HSTROP T <6 <6 <6                 Brief Urine Lab Results  (Last result in the past 365 days)        Color   Clarity   Blood   Leuk Est   Nitrite   Protein   CREAT   Urine HCG        03/23/25 1024               Negative             Microbiology Results (last 10 days)       ** No results found for the last 240 hours. **            CT Angiogram Chest  Result Date: 3/23/2025  CT ANGIOGRAM CHEST Date of Exam: 3/23/2025 10:42 AM EDT Indication: pe. Comparison: None available. Technique: CTA of the chest was performed after the uneventful intravenous administration of 85 mL Isovue-370. Reconstructed coronal and sagittal images were also obtained. In addition, a 3-D volume rendered image was created for interpretation. Automated exposure control and iterative reconstruction methods were used. FINDINGS: Thoracic inlet: Unremarkable. Pulmonary arteries: No filling defects are identified within the pulmonary arteries to suggest acute pulmonary embolism. Great vessels: The thoracic aorta and proximal arch vessels appear unremarkable. Mediastinum/Michelle: No pathologically enlarged mediastinal lymph nodes are seen. The esophagus appears unremarkable. Lung parenchyma: The lungs appear adequately aerated. No acute consolidation is seen. No suspicious pulmonary nodules are seen. Left lung base granuloma. Trachea and airways: The trachea and central airways appear unremarkable.  Pleural space: No significant pleural effusion or pneumothorax is seen. Heart and pericardium: The heart and pericardium appear unremarkable. Chest wall: No acute or suspicious osseous or soft tissue lesion is identified. Upper abdomen: No acute abnormality is identified within the visualized upper abdomen.     Impression: 1.No acute abnormality is identified within the thorax. Specifically, there is no evidence of acute pulmonary embolism. 2.Please see above for additional details. Electronically Signed: Yosef Montoya MD  3/23/2025 11:14 AM EDT  Workstation ID: [id number]    CT Head Without Contrast  Result Date: 3/23/2025  CT HEAD WO CONTRAST Date of Exam: 3/23/2025 10:42 AM EDT Indication: near syncope. Comparison: None available. Technique: Axial CT images were obtained of the head without contrast administration.  Automated exposure control and iterative construction methods were used. Findings: The ventricles have a normal size and configuration. No evidence of acute intracranial hemorrhage, mass or midline shift. No extra-axial fluid collections are identified. There are no skull fractures. The paranasal sinuses and mastoid air cells are clear.     Impression: Impression: Normal exam. Electronically Signed: Sam Mccormick MD  3/23/2025 11:11 AM EDT  Workstation ID: [id number]    XR Chest 1 View  Result Date: 3/23/2025  XR CHEST 1 VW Date of Exam: 3/23/2025 9:45 AM EDT Indication: Weak/Dizzy/AMS triage protocol Comparison: 3/21/2025 FINDINGS: The lungs are well-expanded. The heart and pulmonary vasculature are within normal limits. No pleural effusions are identified. There are no active appearing infiltrates. No evidence of pneumothorax.     Impression: No active disease. Electronically Signed: Sam Mccormick MD  3/23/2025 10:11 AM EDT  Workstation ID: [id number]    XR Chest 1 View  Result Date: 3/21/2025  XR CHEST 1 VW Date of Exam: 3/21/2025 5:19 PM EDT Indication: palpitations, presyncope Comparison: None  available. Findings: Lungs are well expanded and appear clear. No pneumothorax or large pleural effusion is seen. Heart size is normal. There is mild leftward curvature of the upper thoracic spine. Zipper projects over the upper chest.     Impression: Impression: No acute cardiopulmonary abnormality is identified. Electronically Signed: Coco Madden  3/21/2025 5:31 PM EDT  Workstation ID: [id number]          Assessment & Plan   Assessment & Plan       Pre-syncope    Other specified anxiety disorders    Panic attack      45 y.o. female With history of possible vasovagal syncope, who presented for evaluation of presyncopal symptoms.  As a teenager, she had recurrent episodes of lightheadedness and was diagnosed with vasovagal syncope.  She was seen recently in our ER on 3/21 for presyncopal symptoms. She was sent home from the ER at that time.  Symptoms persisted and worsened and she presented again for further evaluation.  Patient stated that she had several episodes on Tuesday, Wednesday, Friday, and a few yesterday.  Episodes consist of beginning to feel lightheadedness, then feels like her heart is racing and feels like she is dying, she then feels tingling in both hands.  She feels very anxious during these episodes.  No chest pain.  Has intermittent palpitations. She states that the episode gets better by taking slow deep breaths.  States that she is always stressed.  She also reports feeling intermittently lightheaded with standing.    In the ER, high-sensitivity troponin less than 6 x 2, CMP unrevealing, CBC unrevealing, UA with small leuk esterase, no nitrites, 11-20 WBCs, 21-30 squamous cells, TSH 2.98, phosphorus 2.2.  CT head negative.  CTA chest negative for PE.  EKG with QTc 454, normal sinus rhythm.  She was given phosphorus, normal saline bolus.    Presentation most consistent with underlying anxiety disorder with panic attacks. Also possible orthostatic hypotension.     Anxiety  Panic attacks  -Suspect  panic attacks are the primary underlying issue, but will r/o more serious causes given palpitations, orthostatic symptoms, etc. & pre-syncopal symptom proceeds all other symptoms   -Pt willing to trial low-dose lexapro. Discussed risks of worsening symptoms, SI, HI w patient and if she experiences any of these, to seek medical attention. She expressed understanding.   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 Ordered. Started low-dose, can increase if needed.     Pre-syncope  Hx of possible vasovagal syncope  -Monitor on telemetry  -ECHO pending  -AM EKG  -Orthostatic vitals x1  -Consider cardiac monitor on DC  -Home tmw if all re-assuring    Hypophosphatemia  -Replaced in the ER  -AM labs    DVT prophylaxis:  SCDs    CODE STATUS:  FULL CODE  Code Status (Patient has no pulse and is not breathing): CPR (Attempt to Resuscitate)  Medical Interventions (Patient has pulse or is breathing): Full Support  Level Of Support Discussed With: Patient      Expected Discharge  Expected discharge date/ time has not been documented.      This note has been completed as part of a split-shared workflow.     Signature: Electronically signed by Beatriz Boyd MD, 03/23/25, 2:28 PM EDT

## 2025-03-23 NOTE — PLAN OF CARE
Problem: Adult Inpatient Plan of Care  Goal: Plan of Care Review  Outcome: Progressing  Goal: Patient-Specific Goal (Individualized)  Outcome: Progressing  Goal: Absence of Hospital-Acquired Illness or Injury  Outcome: Progressing  Intervention: Identify and Manage Fall Risk  Recent Flowsheet Documentation  Taken 3/23/2025 1623 by Yue Segovia, RN  Safety Promotion/Fall Prevention:   activity supervised   room organization consistent   safety round/check completed  Intervention: Prevent Skin Injury  Recent Flowsheet Documentation  Taken 3/23/2025 1623 by Yue Segovia RN  Body Position: position changed independently  Skin Protection: transparent dressing maintained  Goal: Optimal Comfort and Wellbeing  Outcome: Progressing  Goal: Readiness for Transition of Care  Outcome: Progressing   Goal Outcome Evaluation:         Pt resting in,NSR on tele, RA. Safety precautions utilized and maintained.

## 2025-03-23 NOTE — ED NOTES
Feli Good    Nursing Report ED to Floor:  Mental status: ao4  Ambulatory status: independent  Oxygen Therapy:  ra  Cardiac Rhythm: nsr  Admitted from: ed  Safety Concerns:  none  Precautions: none  Social Issues: none  ED Room #:  4    ED Nurse Phone Extension - [phone] or may call [phone].      HPI:   Chief Complaint   Patient presents with    Dizziness       Past Medical History:  No past medical history on file.     Past Surgical History:  No past surgical history on file.     Admitting Doctor:   Beatriz Boyd MD    Consulting Provider(s):  Consults       No orders found from 2/22/2025 to 3/24/2025.             Admitting Diagnosis:   The primary encounter diagnosis was Near syncope. Diagnoses of Hypophosphatemia, History of thyroid disease, and Referred by health care professional were also pertinent to this visit.    Most Recent Vitals:   Vitals:    03/23/25 1200 03/23/25 1202 03/23/25 1203 03/23/25 1204   BP:       BP Location:       Patient Position:       Pulse: 83 84 89 78   Resp:       Temp:       TempSrc:       SpO2: 100% 100% 99% 99%   Weight:       Height:           Active LDAs/IV Access:   Lines, Drains & Airways       Active LDAs       Name Placement date Placement time Site Days    Peripheral IV 03/23/25 1025 Right Antecubital 03/23/25  1025  Antecubital  less than 1                    Labs (abnormal labs have a star):   Labs Reviewed   COMPREHENSIVE METABOLIC PANEL - Abnormal; Notable for the following components:       Result Value    Chloride 108 (*)     All other components within normal limits    Narrative:     GFR Categories in Chronic Kidney Disease (CKD)      GFR Category          GFR (mL/min/1.73)    Interpretation  G1                     90 or greater         Normal or high (1)  G2                      60-89                Mild decrease (1)  G3a                   45-59                Mild to moderate decrease  G3b                   30-44                Moderate to severe decrease  G4                     15-29                Severe decrease  G5                    14 or less           Kidney failure          (1)In the absence of evidence of kidney disease, neither GFR category G1 or G2 fulfill the criteria for CKD.    eGFR calculation 2021 CKD-EPI creatinine equation, which does not include race as a factor   URINALYSIS W/ MICROSCOPIC IF INDICATED (NO CULTURE) - Abnormal; Notable for the following components:    Appearance, UA Cloudy (*)     Leuk Esterase, UA Small (1+) (*)     All other components within normal limits   CBC WITH AUTO DIFFERENTIAL - Abnormal; Notable for the following components:    RDW 12.1 (*)     All other components within normal limits   PHOSPHORUS - Abnormal; Notable for the following components:    Phosphorus 2.2 (*)     All other components within normal limits   URINALYSIS, MICROSCOPIC ONLY - Abnormal; Notable for the following components:    RBC, UA 3-5 (*)     WBC, UA 11-20 (*)     Squamous Epithelial Cells, UA 21-30 (*)     All other components within normal limits   TROPONIN - Normal    Narrative:     High Sensitive Troponin T Reference Range:  <14.0 ng/L- Negative Female for AMI  <22.0 ng/L- Negative Male for AMI  >=14 - Abnormal Female indicating possible myocardial injury.  >=22 - Abnormal Male indicating possible myocardial injury.   Clinicians would have to utilize clinical acumen, EKG, Troponin, and serial changes to determine if it is an Acute Myocardial Infarction or myocardial injury due to an underlying chronic condition.        MAGNESIUM - Normal   T4, FREE - Normal   TSH - Normal   LACTIC ACID, PLASMA - Normal   PROCALCITONIN - Normal    Narrative:     As a Marker for Sepsis (Non-Neonates):    1. <0.5 ng/mL represents a low risk of severe sepsis and/or septic shock.  2. >2 ng/mL represents a high risk of severe sepsis and/or septic shock.    As a Marker for Lower Respiratory Tract Infections that require antibiotic therapy:    PCT on Admission    Antibiotic Therapy        "6-12 Hrs later    >0.5                Strongly Recommended  >0.25 - <0.5        Recommended   0.1 - 0.25          Discouraged              Remeasure/reassess PCT  <0.1                Strongly Discouraged     Remeasure/reassess PCT    As 28 day mortality risk marker: \"Change in Procalcitonin Result\" (>80% or <=80%) if Day 0 (or Day 1) and Day 4 values are available. Refer to http://[email or web address] [email or web address]-[email or web address]    Change in PCT <=80%  A decrease of PCT levels below or equal to 80% defines a positive change in PCT test result representing a higher risk for 28-day all-cause mortality of patients diagnosed with severe sepsis for septic shock.    Change in PCT >80%  A decrease of PCT levels of more than 80% defines a negative change in PCT result representing a lower risk for 28-day all-cause mortality of patients diagnosed with severe sepsis or septic shock.      POCT PEFORM URINE PREGNANCY - Normal   RAINBOW DRAW    Narrative:     The following orders were created for panel order [place] Draw.  Procedure                               Abnormality         Status                     ---------                               -----------         ------                     Green Top (Gel)[[id number]]                                  Final result               Lavender Top[[id number]]                                     Final result               Gold Top - SST[[id number]]                                   Final result               Hughes Top[[id number]]                                         Final result               Light Blue Top[[id number]]                                   Final result                 Please view results for these tests on the individual orders.   HIGH SENSITIVITIY TROPONIN T 1HR    Narrative:     High Sensitive Troponin T Reference Range:  <14.0 ng/L- Negative Female for AMI  <22.0 ng/L- Negative Male for AMI  >=14 - Abnormal Female indicating possible myocardial injury.  >=22 - Abnormal Male indicating " possible myocardial injury.   Clinicians would have to utilize clinical acumen, EKG, Troponin, and serial changes to determine if it is an Acute Myocardial Infarction or myocardial injury due to an underlying chronic condition.        CBC AND DIFFERENTIAL    Narrative:     The following orders were created for panel order CBC & Differential.  Procedure                               Abnormality         Status                     ---------                               -----------         ------                     CBC Auto Differential[[id number]]        Abnormal            Final result                 Please view results for these tests on the individual orders.   GREEN TOP   LAVENDER TOP   GOLD TOP - SST   GRAY TOP   LIGHT BLUE TOP       Meds Given in ED:   Medications   sodium chloride 0.9 % flush 10 mL (has no administration in time range)   Potassium Replacement - Follow Nurse / BPA Driven Protocol (has no administration in time range)   Magnesium Standard Dose Replacement - Follow Nurse / BPA Driven Protocol (has no administration in time range)   Phosphorus Replacement - Follow Nurse / BPA Driven Protocol (has no administration in time range)   Calcium Replacement - Follow Nurse / BPA Driven Protocol (has no administration in time range)   sodium phosphates 15 mmol in sodium chloride 0.9 % 250 mL infusion (has no administration in time range)   sodium chloride 0.9 % bolus 1,000 mL (1,000 mL Intravenous New Bag 3/23/25 1031)   iopamidol (ISOVUE-370) 76 % injection 85 mL (85 mL Intravenous Given 3/23/25 1058)           Last NIH score:                                                          Dysphagia screening results:  Patient Factors Component (Dysphagia:Stroke or Rule-out)  Best Eye Response: 4-->(E4) spontaneous (03/23/25 1028)  Best Motor Response: 6-->(M6) obeys commands (03/23/25 1028)  Best Verbal Response: 5-->(V5) oriented (03/23/25 1028)  Grant Coma Scale Score: 15 (03/23/25 1028)     Eliud Coma  Scale:  No data recorded     CIWA:        Restraint Type:            Isolation Status:  No active isolations

## 2025-03-24 ENCOUNTER — APPOINTMENT (OUTPATIENT)
Dept: CARDIOLOGY | Facility: HOSPITAL | Age: 45
End: 2025-03-24
Payer: COMMERCIAL

## 2025-03-24 VITALS
HEART RATE: 73 BPM | TEMPERATURE: 98.3 F | DIASTOLIC BLOOD PRESSURE: 67 MMHG | HEIGHT: 66 IN | RESPIRATION RATE: 16 BRPM | WEIGHT: 125 LBS | BODY MASS INDEX: 20.09 KG/M2 | SYSTOLIC BLOOD PRESSURE: 116 MMHG | OXYGEN SATURATION: 97 %

## 2025-03-24 PROBLEM — R00.2 PALPITATIONS: Status: ACTIVE | Noted: 2025-03-24

## 2025-03-24 LAB
ANION GAP SERPL CALCULATED.3IONS-SCNC: 10 MMOL/L (ref 5–15)
BUN SERPL-MCNC: 15 MG/DL (ref 6–20)
BUN/CREAT SERPL: 29.4 (ref 7–25)
CALCIUM SPEC-SCNC: 8.8 MG/DL (ref 8.6–10.5)
CHLORIDE SERPL-SCNC: 109 MMOL/L (ref 98–107)
CO2 SERPL-SCNC: 26 MMOL/L (ref 22–29)
CREAT SERPL-MCNC: 0.51 MG/DL (ref 0.57–1)
EGFRCR SERPLBLD CKD-EPI 2021: 118.2 ML/MIN/1.73
GLUCOSE SERPL-MCNC: 86 MG/DL (ref 65–99)
MAGNESIUM SERPL-MCNC: 2 MG/DL (ref 1.6–2.6)
PHOSPHATE SERPL-MCNC: 3.5 MG/DL (ref 2.5–4.5)
PHOSPHATE SERPL-MCNC: 4.8 MG/DL (ref 2.5–4.5)
POTASSIUM SERPL-SCNC: 4.1 MMOL/L (ref 3.5–5.2)
QT INTERVAL: 370 MS
QTC INTERVAL: 437 MS
SODIUM SERPL-SCNC: 145 MMOL/L (ref 136–145)

## 2025-03-24 PROCEDURE — 84100 ASSAY OF PHOSPHORUS: CPT | Performed by: STUDENT IN AN ORGANIZED HEALTH CARE EDUCATION/TRAINING PROGRAM

## 2025-03-24 PROCEDURE — 80048 BASIC METABOLIC PNL TOTAL CA: CPT | Performed by: STUDENT IN AN ORGANIZED HEALTH CARE EDUCATION/TRAINING PROGRAM

## 2025-03-24 PROCEDURE — 83735 ASSAY OF MAGNESIUM: CPT | Performed by: STUDENT IN AN ORGANIZED HEALTH CARE EDUCATION/TRAINING PROGRAM

## 2025-03-24 PROCEDURE — 99239 HOSP IP/OBS DSCHRG MGMT >30: CPT | Performed by: INTERNAL MEDICINE

## 2025-03-24 PROCEDURE — G0378 HOSPITAL OBSERVATION PER HR: HCPCS

## 2025-03-24 PROCEDURE — 84100 ASSAY OF PHOSPHORUS: CPT | Performed by: EMERGENCY MEDICINE

## 2025-03-24 RX ORDER — CLONAZEPAM 0.5 MG/1
0.25 TABLET ORAL ONCE
Status: COMPLETED | OUTPATIENT
Start: 2025-03-24 | End: 2025-03-24

## 2025-03-24 RX ADMIN — CLONAZEPAM 0.25 MG: 0.5 TABLET ORAL at 03:50

## 2025-03-24 RX ADMIN — Medication 1000 UNITS: at 09:00

## 2025-03-24 RX ADMIN — Medication 10 ML: at 09:01

## 2025-03-24 RX ADMIN — MAGNESIUM OXIDE TAB 400 MG (241.3 MG ELEMENTAL MG) 400 MG: 400 (241.3 MG) TAB at 09:01

## 2025-03-24 NOTE — PLAN OF CARE
Problem: Adult Inpatient Plan of Care  Goal: Absence of Hospital-Acquired Illness or Injury  Intervention: Identify and Manage Fall Risk  Recent Flowsheet Documentation  Taken 3/24/2025 0418 by Chanell Potter, RN  Safety Promotion/Fall Prevention:   activity supervised   assistive device/personal items within reach   clutter free environment maintained   fall prevention program maintained   lighting adjusted   nonskid shoes/slippers when out of bed   room organization consistent   safety round/check completed  Taken 3/24/2025 0218 by Chanell Potter, RN  Safety Promotion/Fall Prevention:   activity supervised   assistive device/personal items within reach   clutter free environment maintained   fall prevention program maintained   lighting adjusted   nonskid shoes/slippers when out of bed   room organization consistent   safety round/check completed  Taken 3/24/2025 0018 by Chanell Potter, RN  Safety Promotion/Fall Prevention:   activity supervised   assistive device/personal items within reach   clutter free environment maintained   fall prevention program maintained   lighting adjusted   nonskid shoes/slippers when out of bed   room organization consistent   safety round/check completed  Taken 3/23/2025 2218 by Chanell Potter, RN  Safety Promotion/Fall Prevention:   activity supervised   assistive device/personal items within reach   clutter free environment maintained   fall prevention program maintained   lighting adjusted   nonskid shoes/slippers when out of bed   room organization consistent   safety round/check completed  Taken 3/23/2025 2018 by Chanell Potter, RN  Safety Promotion/Fall Prevention:   activity supervised   assistive device/personal items within reach   clutter free environment maintained   fall prevention program maintained   lighting adjusted   nonskid shoes/slippers when out of bed   room organization consistent   safety round/check completed  Intervention: Prevent Skin  Injury  Recent Flowsheet Documentation  Taken 3/24/2025 0418 by Chanell Potter RN  Body Position: position changed independently  Taken 3/24/2025 0218 by Chanell Potter RN  Body Position: position changed independently  Taken 3/24/2025 0018 by Chanell Potter RN  Body Position: position changed independently  Taken 3/23/2025 2218 by Chanell Potter RN  Body Position: position changed independently  Taken 3/23/2025 2018 by Chanell Potter RN  Body Position: position changed independently  Intervention: Prevent Infection  Recent Flowsheet Documentation  Taken 3/24/2025 0418 by Chanell Potter RN  Infection Prevention:   environmental surveillance performed   hand hygiene promoted   rest/sleep promoted   single patient room provided  Taken 3/24/2025 0218 by Chanell Potter RN  Infection Prevention:   environmental surveillance performed   hand hygiene promoted   rest/sleep promoted   single patient room provided  Taken 3/24/2025 0018 by Chanell Potter RN  Infection Prevention:   environmental surveillance performed   hand hygiene promoted   rest/sleep promoted   single patient room provided  Taken 3/23/2025 2218 by Chanell Potter RN  Infection Prevention:   environmental surveillance performed   hand hygiene promoted   rest/sleep promoted   single patient room provided  Taken 3/23/2025 2018 by Chanell Potter RN  Infection Prevention:   environmental surveillance performed   hand hygiene promoted   rest/sleep promoted   single patient room provided  Goal: Optimal Comfort and Wellbeing  Intervention: Provide Person-Centered Care  Recent Flowsheet Documentation  Taken 3/23/2025 2018 by Chanell Potter RN  Trust Relationship/Rapport:   care explained   choices provided   questions answered   questions encouraged   thoughts/feelings acknowledged   Goal Outcome Evaluation:

## 2025-03-24 NOTE — DISCHARGE SUMMARY
[facility] Medicine Services  DISCHARGE SUMMARY    Patient Name: Feli Good  : 1980  MRN: [id number]    Date of Admission: 3/23/2025  9:29 AM  Date of Discharge:  3/24/2025  Primary Care Physician: Vicente Mann DO    Consults       No orders found for last 30 day(s).            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          Hospital Course:  Feli Good is a 45 y.o. female h/o remote vasovagal syncope (as teenager), current perimenopause who presents with multiple episodes of shaking, palpitations, feeling of upcoming death, lightheadedness, never with syncope, never with positional change.     Patient reports having similar events when child has seizures for multiple years. But in last few days, has had more episodes.    Episodes here observed on telemetry without arrhythmia, improved w benzo. TSH/free T4 wnl.  Most c/w panic attacks  Offered ECHO but lower clinical suspicion, patient declines  Has follow-up with her PCP today to discuss options. We discussed possible options for some time as well in terms of treatment options moving forward.      Discharge Follow Up Recommendations for outpatient labs/diagnostics:   Follow-up PCP this afternoon as patient already has scheduled    Day of Discharge     HPI:   Doing OK today  Reports episodes overnight - reviewed results to now  Has already called her PCP and has appointment at 1600 today    Vital Signs:   Temp:  [98.2 °F (36.8 °C)-98.3 °F (36.8 °C)] 98.3 °F (36.8 °C)  Heart Rate:  [73-84] 73  Resp:  [16] 16  BP: (107-116)/(63-79) 116/67  Flow (L/min) (Oxygen Therapy):  [2] 2      Physical Exam:  Constitutional: No acute distress, awake, alert thin  female sitting up in bed.  closeby  HENT: NCAT, mucous membranes moist  Respiratory: Clear to auscultation bilaterally, respiratory effort normal   Cardiovascular: RRR, no murmurs, rubs, or gallops  Gastrointestinal: Soft, nontender, nondistended  Musculoskeletal: Muscle tone within normal limits, no joint effusions appreciated  Psychiatric: Anxious affect, cooperative  Neurologic: Alert and oriented, facial movements symmetric, mild hyperreflexia, strength intact bilateral arms/legs without tremor, speech clear  Skin: No rashes    Pertinent  and/or Most Recent Results     LAB RESULTS:      Lab 03/23/25  1016 03/21/25  1636   WBC 5.23 6.79   HEMOGLOBIN 15.3 14.4   HEMATOCRIT 44.8 41.1   PLATELETS 204 197   NEUTROS ABS 3.44 5.30   IMMATURE GRANS (ABS) 0.02 0.02   LYMPHS ABS 1.29 1.03   MONOS ABS 0.31 0.34   EOS ABS 0.13 0.05   MCV 92.0 90.9   PROCALCITONIN 0.04  --    LACTATE 1.4 1.2   D DIMER QUANT  --  <0.27         Lab 03/24/25  0609 03/24/25  0004 03/23/25  1016 03/21/25  1636   SODIUM 145  --  144 139   POTASSIUM 4.1  --  4.2 3.5   CHLORIDE 109*  --  108* 103   CO2 26.0  --  25.0 25.0   ANION GAP 10.0  --  11.0 11.0   BUN 15  --  11 12   CREATININE 0.51*  --  0.64 0.52*   EGFR 118.2  --  111.9 117.7   GLUCOSE 86  --  97 109*   CALCIUM 8.8  --  9.5 9.1   MAGNESIUM 2.0  --  1.9 1.9   PHOSPHORUS 3.5 4.8* 2.2* 1.6*   TSH  --   --  2.980 3.420         Lab 03/23/25  1016 03/21/25  1636   TOTAL PROTEIN 6.9 6.7   ALBUMIN 4.5 4.5   GLOBULIN 2.4 2.2   ALT (SGPT) 26 26   AST (SGOT) 23 26   BILIRUBIN 0.7 0.4   ALK PHOS 65 64         Lab 03/23/25  1201 03/23/25  1016 03/21/25  1636   PROBNP  --   --  168.2   HSTROP T <6 <6 <6                 Brief Urine Lab Results  (Last result in the past 365 days)        Color   Clarity   Blood   Leuk Est   Nitrite   Protein   CREAT   Urine HCG        03/23/25 1024               Negative             Microbiology Results (last 10 days)       ** No results found for the last 240  hours. **            CT Angiogram Chest  Result Date: 3/23/2025  CT ANGIOGRAM CHEST Date of Exam: 3/23/2025 10:42 AM EDT Indication: pe. Comparison: None available. Technique: CTA of the chest was performed after the uneventful intravenous administration of 85 mL Isovue-370. Reconstructed coronal and sagittal images were also obtained. In addition, a 3-D volume rendered image was created for interpretation. Automated exposure control and iterative reconstruction methods were used. FINDINGS: Thoracic inlet: Unremarkable. Pulmonary arteries: No filling defects are identified within the pulmonary arteries to suggest acute pulmonary embolism. Great vessels: The thoracic aorta and proximal arch vessels appear unremarkable. Mediastinum/Michelle: No pathologically enlarged mediastinal lymph nodes are seen. The esophagus appears unremarkable. Lung parenchyma: The lungs appear adequately aerated. No acute consolidation is seen. No suspicious pulmonary nodules are seen. Left lung base granuloma. Trachea and airways: The trachea and central airways appear unremarkable. Pleural space: No significant pleural effusion or pneumothorax is seen. Heart and pericardium: The heart and pericardium appear unremarkable. Chest wall: No acute or suspicious osseous or soft tissue lesion is identified. Upper abdomen: No acute abnormality is identified within the visualized upper abdomen.     1.No acute abnormality is identified within the thorax. Specifically, there is no evidence of acute pulmonary embolism. 2.Please see above for additional details. Electronically Signed: Yosef Montoya MD  3/23/2025 11:14 AM EDT  Workstation ID: [id number]    CT Head Without Contrast  Result Date: 3/23/2025  CT HEAD WO CONTRAST Date of Exam: 3/23/2025 10:42 AM EDT Indication: near syncope. Comparison: None available. Technique: Axial CT images were obtained of the head without contrast administration.  Automated exposure control and iterative construction  methods were used. Findings: The ventricles have a normal size and configuration. No evidence of acute intracranial hemorrhage, mass or midline shift. No extra-axial fluid collections are identified. There are no skull fractures. The paranasal sinuses and mastoid air cells are clear.     Impression: Normal exam. Electronically Signed: Sam Mccormick MD  3/23/2025 11:11 AM EDT  Workstation ID: [id number]    XR Chest 1 View  Result Date: 3/23/2025  XR CHEST 1 VW Date of Exam: 3/23/2025 9:45 AM EDT Indication: Weak/Dizzy/AMS triage protocol Comparison: 3/21/2025 FINDINGS: The lungs are well-expanded. The heart and pulmonary vasculature are within normal limits. No pleural effusions are identified. There are no active appearing infiltrates. No evidence of pneumothorax.     No active disease. Electronically Signed: Sam Mccormick MD  3/23/2025 10:11 AM EDT  Workstation ID: [id number]    XR Chest 1 View  Result Date: 3/21/2025  XR CHEST 1 VW Date of Exam: 3/21/2025 5:19 PM EDT Indication: palpitations, presyncope Comparison: None available. Findings: Lungs are well expanded and appear clear. No pneumothorax or large pleural effusion is seen. Heart size is normal. There is mild leftward curvature of the upper thoracic spine. Zipper projects over the upper chest.     Impression: No acute cardiopulmonary abnormality is identified. Electronically Signed: Coco Madden  3/21/2025 5:31 PM EDT  Workstation ID: [id number]                  Plan for Follow-up of Pending Labs/Results:     Discharge Details        Discharge Medications        Continue These Medications        Instructions Start Date   cholecalciferol 25 MCG (1000 UT) tablet  Commonly known as: VITAMIN D3   1,000 Units, Daily      hydrOXYzine 25 MG tablet  Commonly known as: ATARAX   25 mg, Oral, Every 8 Hours PRN      IODINE TINCTURE EX   2 drops, Daily      magnesium oxide 400 MG tablet  Commonly known as: MAG-OX   400 mg, Daily               No Known  Allergies      Discharge Disposition:  Home or Self Care    Diet:  Hospital:No active diet order           Activity:      Restrictions or Other Recommendations:         CODE STATUS:    Code Status and Medical Interventions: CPR (Attempt to Resuscitate); Full Support   Ordered at: 03/23/25 1438     Code Status (Patient has no pulse and is not breathing):    CPR (Attempt to Resuscitate)     Medical Interventions (Patient has pulse or is breathing):    Full Support     Level Of Support Discussed With:    Patient       No future appointments.    Additional Instructions for the Follow-ups that You Need to Schedule       Discharge Follow-up with PCP   As directed       Currently Documented PCP:    Vicente Mann DO    PCP Phone Number:    [phone]     Follow Up Details: PCP f/u today - patient has already scheduled                      Jennifer Portillo MD  03/24/25      Time Spent on Discharge:  I spent  55  minutes on this discharge activity which included: face-to-face encounter with the patient, reviewing the data in the system, coordination of the care with the nursing staff as well as consultants, documentation, and entering orders.  D/w patient, review of telemetry